# Patient Record
Sex: MALE | Race: OTHER | Employment: FULL TIME | URBAN - METROPOLITAN AREA
[De-identification: names, ages, dates, MRNs, and addresses within clinical notes are randomized per-mention and may not be internally consistent; named-entity substitution may affect disease eponyms.]

---

## 2023-03-10 ENCOUNTER — TELEPHONE (OUTPATIENT)
Dept: PRIMARY CARE | Facility: CLINIC | Age: 53
End: 2023-03-10

## 2024-11-26 ENCOUNTER — APPOINTMENT (OUTPATIENT)
Dept: PRIMARY CARE | Facility: CLINIC | Age: 54
End: 2024-11-26
Payer: COMMERCIAL

## 2024-11-26 ENCOUNTER — LAB (OUTPATIENT)
Dept: LAB | Facility: LAB | Age: 54
End: 2024-11-26
Payer: COMMERCIAL

## 2024-11-26 VITALS
WEIGHT: 194.2 LBS | BODY MASS INDEX: 26.3 KG/M2 | DIASTOLIC BLOOD PRESSURE: 83 MMHG | HEIGHT: 72 IN | SYSTOLIC BLOOD PRESSURE: 175 MMHG | OXYGEN SATURATION: 98 % | HEART RATE: 100 BPM

## 2024-11-26 DIAGNOSIS — Z00.00 VISIT FOR PREVENTIVE HEALTH EXAMINATION: ICD-10-CM

## 2024-11-26 DIAGNOSIS — Z00.00 VISIT FOR PREVENTIVE HEALTH EXAMINATION: Primary | ICD-10-CM

## 2024-11-26 LAB
ALBUMIN SERPL BCP-MCNC: 4.8 G/DL (ref 3.4–5)
ALP SERPL-CCNC: 57 U/L (ref 33–120)
ALT SERPL W P-5'-P-CCNC: 15 U/L (ref 10–52)
ANION GAP SERPL CALC-SCNC: 14 MMOL/L (ref 10–20)
APPEARANCE UR: ABNORMAL
AST SERPL W P-5'-P-CCNC: 13 U/L (ref 9–39)
BILIRUB SERPL-MCNC: 0.8 MG/DL (ref 0–1.2)
BILIRUB UR STRIP.AUTO-MCNC: NEGATIVE MG/DL
BUN SERPL-MCNC: 17 MG/DL (ref 6–23)
CALCIUM SERPL-MCNC: 9.8 MG/DL (ref 8.6–10.6)
CHLORIDE SERPL-SCNC: 105 MMOL/L (ref 98–107)
CHOLEST SERPL-MCNC: 145 MG/DL (ref 0–199)
CHOLESTEROL/HDL RATIO: 2.7
CO2 SERPL-SCNC: 26 MMOL/L (ref 21–32)
COLOR UR: ABNORMAL
CREAT SERPL-MCNC: 0.96 MG/DL (ref 0.5–1.3)
EGFRCR SERPLBLD CKD-EPI 2021: >90 ML/MIN/1.73M*2
ERYTHROCYTE [DISTWIDTH] IN BLOOD BY AUTOMATED COUNT: 12.4 % (ref 11.5–14.5)
GLUCOSE SERPL-MCNC: 93 MG/DL (ref 74–99)
GLUCOSE UR STRIP.AUTO-MCNC: NORMAL MG/DL
HCT VFR BLD AUTO: 45.9 % (ref 41–52)
HDLC SERPL-MCNC: 54.4 MG/DL
HGB BLD-MCNC: 15.5 G/DL (ref 13.5–17.5)
KETONES UR STRIP.AUTO-MCNC: NEGATIVE MG/DL
LDLC SERPL CALC-MCNC: 64 MG/DL
LEUKOCYTE ESTERASE UR QL STRIP.AUTO: NEGATIVE
MCH RBC QN AUTO: 30.2 PG (ref 26–34)
MCHC RBC AUTO-ENTMCNC: 33.8 G/DL (ref 32–36)
MCV RBC AUTO: 90 FL (ref 80–100)
NITRITE UR QL STRIP.AUTO: NEGATIVE
NON HDL CHOLESTEROL: 91 MG/DL (ref 0–149)
NRBC BLD-RTO: 0 /100 WBCS (ref 0–0)
PH UR STRIP.AUTO: 5 [PH]
PLATELET # BLD AUTO: 305 X10*3/UL (ref 150–450)
POTASSIUM SERPL-SCNC: 4.5 MMOL/L (ref 3.5–5.3)
PROT SERPL-MCNC: 7.5 G/DL (ref 6.4–8.2)
PROT UR STRIP.AUTO-MCNC: NEGATIVE MG/DL
PSA SERPL-MCNC: 0.49 NG/ML
RBC # BLD AUTO: 5.13 X10*6/UL (ref 4.5–5.9)
RBC # UR STRIP.AUTO: NEGATIVE /UL
SODIUM SERPL-SCNC: 140 MMOL/L (ref 136–145)
SP GR UR STRIP.AUTO: 1.02
TRIGL SERPL-MCNC: 135 MG/DL (ref 0–149)
TSH SERPL-ACNC: 1.34 MIU/L (ref 0.44–3.98)
UROBILINOGEN UR STRIP.AUTO-MCNC: NORMAL MG/DL
VLDL: 27 MG/DL (ref 0–40)
WBC # BLD AUTO: 6.8 X10*3/UL (ref 4.4–11.3)

## 2024-11-26 PROCEDURE — 84153 ASSAY OF PSA TOTAL: CPT

## 2024-11-26 PROCEDURE — 84443 ASSAY THYROID STIM HORMONE: CPT

## 2024-11-26 PROCEDURE — 81003 URINALYSIS AUTO W/O SCOPE: CPT

## 2024-11-26 PROCEDURE — 80053 COMPREHEN METABOLIC PANEL: CPT

## 2024-11-26 PROCEDURE — 1036F TOBACCO NON-USER: CPT | Performed by: FAMILY MEDICINE

## 2024-11-26 PROCEDURE — 36415 COLL VENOUS BLD VENIPUNCTURE: CPT

## 2024-11-26 PROCEDURE — 85027 COMPLETE CBC AUTOMATED: CPT

## 2024-11-26 PROCEDURE — 99396 PREV VISIT EST AGE 40-64: CPT | Performed by: FAMILY MEDICINE

## 2024-11-26 PROCEDURE — 3008F BODY MASS INDEX DOCD: CPT | Performed by: FAMILY MEDICINE

## 2024-11-26 PROCEDURE — 80061 LIPID PANEL: CPT

## 2024-11-26 ASSESSMENT — PATIENT HEALTH QUESTIONNAIRE - PHQ9
SUM OF ALL RESPONSES TO PHQ9 QUESTIONS 1 AND 2: 0
2. FEELING DOWN, DEPRESSED OR HOPELESS: NOT AT ALL
1. LITTLE INTEREST OR PLEASURE IN DOING THINGS: NOT AT ALL

## 2024-11-26 NOTE — PROGRESS NOTES
Subjective   Patient ID: Shiv Perez is a 54 y.o. male who presents for Annual Exam (Declined flu shot).    Assessment/Plan     Problem List Items Addressed This Visit       Visit for preventive health examination - Primary    Relevant Orders    Prostate Specific Antigen, Screen    TSH with reflex to Free T4 if abnormal    Lipid Panel    Comprehensive Metabolic Panel    CBC    Urinalysis with Reflex Microscopic   Cologuard 2023 negative  Discussed about diet exercise  Discussed about age-related immunization  Discussed about fasting lab work  Follow-up every year for physical  CT cardiac score is 0  Come back early with any new signs and symptoms  Patient understood and agreed with plan.    HPI    54-year-old male here for physical    No new sudden symptoms  Routine weight lifting exercise  No smoking  6 beers every weekend    No chest pain shortness of breath    Declined flu vaccine  Discussed shingles vaccine  Fasting for lab work today    Stopped smoking more than 20 years ago    No Known Allergies    No current outpatient medications on file.     No current facility-administered medications for this visit.       Objective   Visit Vitals  /83   Pulse 100   Ht 1.829 m (6')   Wt 88.1 kg (194 lb 3.2 oz)   SpO2 98%   BMI 26.34 kg/m²   Smoking Status Former   BSA 2.12 m²     Physical Exam    Constitutional   General appearance: Alert and in no acute distress.   Head and Face   Head and face: Normal.     Palpation of the face and sinuses: Normal.    Eyes   Inspection of eyes: Sclera and conjunctiva were normal.    Pupil exam: Pupils were equal in size. Extraocular movements were intact.   Ears, Nose, Mouth, and Throat   Ears: Auricles: Normal.    Otoscopic examination: Tympanic membranes: Normal with no congestion and no discharge. Otic Canals: Normal without tenderness, congestion or discharge.    Hearing: Normal.     Nasal mucosa, septum, and turbinates: Normal without edema or erythema.    Lips, teeth, and  gums: Normal.    Oropharynx: Normal with moist mucus membranes, no congestion. Tonsils: Normal no follicles.   Neck   Neck Exam: Appearance of the neck was normal. No neck masses observed.    Thyroid exam: Not enlarged and no palpable thyroid nodules.   Pulmonary   Respiratory assessment: No respiratory distress, normal respiratory rhythm and effort.    Auscultation of Lungs: Clear bilateral breath sounds.   Cardiovascular   Auscultation of heart: Apical pulse normal, heart rate and rhythm normal, normal S1 and S2, no murmurs and no pericardial rub.    Carotid arteries: Pulses normal with no bruits.    Exam for edema: No peripheral edema.   Chest   Chest: Normal A_P diameter, no pulsation, no intercostal withdrawing. Trachea central.   Abdomen   Abdominal Exam: No bruits, normal bowel sounds, soft, non-tender, no abdominal mass palpated.    Liver and Spleen exam: No hepato-splenomegaly.    Examination for hernias: Normal.    Lymphatic   Palpation of lymph nodes in neck: No cervical lymphadenopathy.   Musculoskeletal   Examination of gait: Normal.    Inspection of digits and nails: No clubbing or cyanosis of the fingernails.    Inspection/palpation of joints, bones and muscles: No joint swelling. Normal movement of all extremities.    Range of Motion: Normal movement of all extremities.   Skin   Skin inspection: Normal skin color and pigmentation, normal skin turgor and no visible rash.   Neurologic   Cranial nerves: Nerves 2-12 were intact, no focal neuro defects.    Reflexes: Normal.     Sensation: Normal.     Coordination: Normal.    Psychiatric   Judgment and insight: Intact.    Orientation: Oriented to person, place, and time.    Recent and remote memory: Normal.     Mood and affect: Normal.     Genitourinary Declined.    Immunization History   Administered Date(s) Administered    Pfizer Purple Cap SARS-CoV-2 04/24/2021, 05/15/2021       Review of Systems    No visits with results within 4 Month(s) from this  visit.   Latest known visit with results is:   Legacy Encounter on 10/31/2022   Component Date Value Ref Range Status    Cholesterol 10/31/2022 157  0 - 199 mg/dL Final    HDL 10/31/2022 60.3  mg/dL Final    Cholesterol/HDL Ratio 10/31/2022 2.6   Final    LDL 10/31/2022 59  0 - 99 mg/dL Final    VLDL 10/31/2022 38  0 - 40 mg/dL Final    Triglycerides 10/31/2022 189 (H)  0 - 149 mg/dL Final    Prostate Specific Antigen,Screen 10/31/2022 0.55  0.00 - 4.00 ng/mL Final    Color, Urine 10/31/2022 YELLOW  STRAW,YELLOW Final    Appearance, Urine 10/31/2022 CLEAR  CLEAR Final    Specific Gravity, Urine 10/31/2022 1.012  1.005 - 1.035 Final    pH, Urine 10/31/2022 6.0  5.0 - 8.0 Final    Protein, Urine 10/31/2022 NEGATIVE  NEGATIVE mg/dL Final    Glucose, Urine 10/31/2022 NEGATIVE  NEGATIVE mg/dL Final    Blood, Urine 10/31/2022 NEGATIVE  NEGATIVE Final    Ketones, Urine 10/31/2022 NEGATIVE  NEGATIVE mg/dL Final    Bilirubin, Urine 10/31/2022 NEGATIVE  NEGATIVE Final    Urobilinogen, Urine 10/31/2022 <2.0  0.0 - 1.9 mg/dL Final    Nitrite, Urine 10/31/2022 NEGATIVE  NEGATIVE Final    Leukocyte Esterase, Urine 10/31/2022 NEGATIVE  NEGATIVE Final    Glucose 10/31/2022 88  74 - 99 mg/dL Final    Sodium 10/31/2022 140  136 - 145 mmol/L Final    Potassium 10/31/2022 4.6  3.5 - 5.3 mmol/L Final    Chloride 10/31/2022 104  98 - 107 mmol/L Final    Bicarbonate 10/31/2022 28  21 - 32 mmol/L Final    Anion Gap 10/31/2022 13  10 - 20 mmol/L Final    Urea Nitrogen 10/31/2022 13  6 - 23 mg/dL Final    Creatinine 10/31/2022 0.97  0.50 - 1.30 mg/dL Final    GFR MALE 10/31/2022 >90  >90 mL/min/1.73m2 Final    Calcium 10/31/2022 9.7  8.6 - 10.6 mg/dL Final    Albumin 10/31/2022 4.5  3.4 - 5.0 g/dL Final    Alkaline Phosphatase 10/31/2022 62  33 - 120 U/L Final    Total Protein 10/31/2022 7.3  6.4 - 8.2 g/dL Final    AST 10/31/2022 17  9 - 39 U/L Final    Total Bilirubin 10/31/2022 0.9  0.0 - 1.2 mg/dL Final    ALT (SGPT) 10/31/2022 20  10 -  52 U/L Final    TSH 10/31/2022 1.54  0.44 - 3.98 mIU/L Final    WBC 10/31/2022 7.3  4.4 - 11.3 x10E9/L Final    nRBC 10/31/2022 0.0  0.0 - 0.0 /100 WBC Final    RBC 10/31/2022 4.85  4.50 - 5.90 x10E12/L Final    Hemoglobin 10/31/2022 14.8  13.5 - 17.5 g/dL Final    Hematocrit 10/31/2022 44.6  41.0 - 52.0 % Final    MCV 10/31/2022 92  80 - 100 fL Final    MCHC 10/31/2022 33.2  32.0 - 36.0 g/dL Final    Platelets 10/31/2022 300  150 - 450 x10E9/L Final    RDW 10/31/2022 12.8  11.5 - 14.5 % Final       Radiology: Reviewed imaging in powerchart.  No results found.    No family history on file.  Social History     Socioeconomic History    Marital status:    Tobacco Use    Smoking status: Former     Types: Cigarettes    Smokeless tobacco: Never   Substance and Sexual Activity    Alcohol use: Yes    Drug use: Never     History reviewed. No pertinent past medical history.  Past Surgical History:   Procedure Laterality Date    HERNIA REPAIR  02/27/2014    Inguinal Hernia Repair    OTHER SURGICAL HISTORY  02/27/2014    Shoulder Excision Of Soft Tissue Tumor       Charting was completed using voice recognition technology and may include unintended errors.

## 2024-12-02 ENCOUNTER — APPOINTMENT (OUTPATIENT)
Dept: PRIMARY CARE | Facility: CLINIC | Age: 54
End: 2024-12-02
Payer: COMMERCIAL